# Patient Record
Sex: FEMALE | URBAN - METROPOLITAN AREA
[De-identification: names, ages, dates, MRNs, and addresses within clinical notes are randomized per-mention and may not be internally consistent; named-entity substitution may affect disease eponyms.]

---

## 2021-03-23 ENCOUNTER — APPOINTMENT (OUTPATIENT)
Dept: URBAN - METROPOLITAN AREA CLINIC 264 | Age: 46
Setting detail: DERMATOLOGY
End: 2021-03-23

## 2021-03-23 DIAGNOSIS — L73.2 HIDRADENITIS SUPPURATIVA: ICD-10-CM

## 2021-03-23 PROCEDURE — 99204 OFFICE O/P NEW MOD 45 MIN: CPT | Mod: 25

## 2021-03-23 PROCEDURE — OTHER ORDER TESTS: OTHER

## 2021-03-23 PROCEDURE — OTHER PRESCRIPTION: OTHER

## 2021-03-23 PROCEDURE — OTHER MEDICATION COUNSELING: OTHER

## 2021-03-23 PROCEDURE — 11900 INJECT SKIN LESIONS </W 7: CPT

## 2021-03-23 PROCEDURE — OTHER COUNSELING: OTHER

## 2021-03-23 PROCEDURE — OTHER ADDITIONAL NOTES: OTHER

## 2021-03-23 PROCEDURE — OTHER INTRALESIONAL KENALOG: OTHER

## 2021-03-23 PROCEDURE — OTHER HUMIRA INITIATION: OTHER

## 2021-03-23 PROCEDURE — OTHER SEPARATE AND IDENTIFIABLE DOCUMENTATION: OTHER

## 2021-03-23 RX ORDER — ADALIMUMAB 40MG/0.4ML
KIT SUBCUTANEOUS
Qty: 3 | Refills: 1 | Status: ERX | COMMUNITY
Start: 2021-03-23

## 2021-03-23 RX ORDER — ADALIMUMAB 80MG/0.8ML
KIT SUBCUTANEOUS
Qty: 1 | Refills: 0 | Status: ERX | COMMUNITY
Start: 2021-03-23

## 2021-03-23 RX ORDER — CLINDAMYCIN PHOSPHATE 10 MG/G
GEL TOPICAL
Qty: 1 | Refills: 2 | Status: ERX | COMMUNITY
Start: 2021-03-23

## 2021-03-23 RX ORDER — DOXYCYCLINE HYCLATE 100 MG/1
CAPSULE, GELATIN COATED ORAL
Qty: 21 | Refills: 1 | Status: ERX | COMMUNITY
Start: 2021-03-23

## 2021-03-23 ASSESSMENT — LOCATION DETAILED DESCRIPTION DERM
LOCATION DETAILED: LEFT MEDIAL BREAST 7-8:00 REGION
LOCATION DETAILED: RIGHT MEDIAL BREAST 4-5:00 REGION

## 2021-03-23 ASSESSMENT — LOCATION SIMPLE DESCRIPTION DERM
LOCATION SIMPLE: LEFT BREAST
LOCATION SIMPLE: RIGHT BREAST

## 2021-03-23 ASSESSMENT — LOCATION ZONE DERM: LOCATION ZONE: TRUNK

## 2021-03-23 NOTE — PROCEDURE: INTRALESIONAL KENALOG
Medical Necessity Clause: This procedure was medically necessary because the lesions that were treated were:
Total Volume Injected (Ccs- Only Use Numbers And Decimals): 0.1
Include Z78.9 (Other Specified Conditions Influencing Health Status) As An Associated Diagnosis?: No
Detail Level: Detailed
Consent: The risks of atrophy were reviewed with the patient.
Concentration Of Solution Injected (Mg/Ml): 10.0
Administered By (Optional): Rosa Elena Ann, NP
X Size Of Lesion In Cm (Optional): 0
Kenalog Preparation: Kenalog

## 2021-03-23 NOTE — PROCEDURE: MEDICATION COUNSELING
Xelanastasiaz Pregnancy And Lactation Text: This medication is Pregnancy Category D and is not considered safe during pregnancy.  The risk during breast feeding is also uncertain.

## 2021-03-23 NOTE — PROCEDURE: HUMIRA INITIATION
Pregnancy And Lactation Warning Text: This medication is Pregnancy Category B and is considered safe during pregnancy. It is unknown if this medication is excreted in breast milk.
Is Phototherapy Contraindicated?: No
Humira Monitoring Guidelines: A yearly test for tuberculosis is required while taking Humira.
Humira Dosing: 160 mg SC day 1, 80 mg SC day 15, then 40 mg SC every other week starting on day 29
Detail Level: Zone
Diagnosis (Required): Hidradenitis Suppurativa

## 2021-03-23 NOTE — PROCEDURE: ADDITIONAL NOTES
Render Risk Assessment In Note?: no
Additional Notes: Buy Hibiclens to wash the affected areas with, it is available OTC. After the area is dry, apply clindamycin gel. \\nTake doxycycline by mouth for 3 weeks. \\nPatient elected for steroid injections today into the cysts. \\nDiscussed Humira injections and pros/cons of using this medication. This is a chronic skin condition that will require lifelong treatment.
Detail Level: Simple

## 2021-04-08 ENCOUNTER — APPOINTMENT (OUTPATIENT)
Dept: URBAN - METROPOLITAN AREA CLINIC 272 | Age: 46
Setting detail: DERMATOLOGY
End: 2021-04-08

## 2021-04-20 ENCOUNTER — RX ONLY (RX ONLY)
Age: 46
End: 2021-04-20

## 2021-04-20 RX ORDER — ADALIMUMAB 40MG/0.4ML
KIT SUBCUTANEOUS
Qty: 3 | Refills: 1 | Status: ERX

## 2021-04-20 RX ORDER — ADALIMUMAB 80MG/0.8ML
KIT SUBCUTANEOUS
Qty: 1 | Refills: 0 | Status: ERX

## 2021-04-27 ENCOUNTER — APPOINTMENT (OUTPATIENT)
Dept: URBAN - METROPOLITAN AREA CLINIC 264 | Age: 46
Setting detail: DERMATOLOGY
End: 2021-04-27

## 2021-04-27 DIAGNOSIS — L73.2 HIDRADENITIS SUPPURATIVA: ICD-10-CM

## 2021-04-27 PROCEDURE — OTHER COUNSELING: OTHER

## 2021-04-27 PROCEDURE — 99213 OFFICE O/P EST LOW 20 MIN: CPT

## 2021-04-27 PROCEDURE — OTHER ADDITIONAL NOTES: OTHER

## 2021-04-27 PROCEDURE — OTHER PRESCRIPTION: OTHER

## 2021-04-27 PROCEDURE — OTHER PRESCRIPTION MEDICATION MANAGEMENT: OTHER

## 2021-04-27 PROCEDURE — OTHER SEPARATE AND IDENTIFIABLE DOCUMENTATION: OTHER

## 2021-04-27 RX ORDER — DOXYCYCLINE HYCLATE 100 MG/1
CAPSULE, GELATIN COATED ORAL
Qty: 60 | Refills: 0 | Status: ERX

## 2021-04-27 ASSESSMENT — LOCATION ZONE DERM: LOCATION ZONE: TRUNK

## 2021-04-27 ASSESSMENT — LOCATION SIMPLE DESCRIPTION DERM
LOCATION SIMPLE: LEFT BREAST
LOCATION SIMPLE: RIGHT BREAST

## 2021-04-27 ASSESSMENT — LOCATION DETAILED DESCRIPTION DERM
LOCATION DETAILED: RIGHT LATERAL BREAST 6-7:00 REGION
LOCATION DETAILED: LEFT MEDIAL BREAST 7-8:00 REGION

## 2021-04-27 NOTE — PROCEDURE: ADDITIONAL NOTES
Additional Notes: Patient declines Humira right now, would like to continue current plan of care and see if they improve with more time. PCP has her labs from us and saw her yesterday, they will manage her cholesterol and liver. Pt aware of her lab results, given a paper copy per her request. \\nPt declined any intralesional injections today. \\nIf PCP is okay with her starting Humira then she will consider it and continue to research it herself.
Render Risk Assessment In Note?: no
Detail Level: Simple

## 2021-04-27 NOTE — PROCEDURE: PRESCRIPTION MEDICATION MANAGEMENT
Detail Level: Zone
Plan: Research Humira some more and speak with PCP again if needed. PCP thought it could be helpful for her.
Continue Regimen: Hibiclens wash, clindamycin topical, and doxycycline by mouth.
Render In Strict Bullet Format?: No

## 2022-11-10 NOTE — PROCEDURE: MEDICATION COUNSELING
PT TO ER WITH HER MOTHER WITH A POSSIBLE INFECTION TO THE RIGHT GREAT TOE. PT WAS AT SCHOOL TODAY AND PT MOTHER STATES THE SCHOOL NURSE CALLED PTS MOTHER AND TOLD HER THE TOE WAS INFECTED. PT DENIES ANY PAIN OR INJURY TO THE TOE. VSS. Calcipotriene Pregnancy And Lactation Text: This medication has not been proven safe during pregnancy. It is unknown if this medication is excreted in breast milk.
